# Patient Record
Sex: FEMALE | Race: OTHER | HISPANIC OR LATINO | URBAN - METROPOLITAN AREA
[De-identification: names, ages, dates, MRNs, and addresses within clinical notes are randomized per-mention and may not be internally consistent; named-entity substitution may affect disease eponyms.]

---

## 2024-11-11 ENCOUNTER — EMERGENCY (EMERGENCY)
Facility: HOSPITAL | Age: 29
LOS: 1 days | Discharge: HOME | End: 2024-11-11
Attending: EMERGENCY MEDICINE
Payer: COMMERCIAL

## 2024-11-11 VITALS
OXYGEN SATURATION: 100 % | RESPIRATION RATE: 16 BRPM | TEMPERATURE: 98 F | DIASTOLIC BLOOD PRESSURE: 65 MMHG | SYSTOLIC BLOOD PRESSURE: 101 MMHG | HEART RATE: 70 BPM

## 2024-11-11 VITALS
SYSTOLIC BLOOD PRESSURE: 113 MMHG | DIASTOLIC BLOOD PRESSURE: 71 MMHG | WEIGHT: 130.07 LBS | RESPIRATION RATE: 17 BRPM | HEART RATE: 74 BPM | TEMPERATURE: 99 F | HEIGHT: 65 IN | OXYGEN SATURATION: 100 %

## 2024-11-11 LAB
ALBUMIN SERPL ELPH-MCNC: 3.9 G/DL — SIGNIFICANT CHANGE UP (ref 3.5–5)
ALP SERPL-CCNC: 41 U/L — SIGNIFICANT CHANGE UP (ref 40–120)
ALT FLD-CCNC: 33 U/L DA — SIGNIFICANT CHANGE UP (ref 10–60)
ANION GAP SERPL CALC-SCNC: 4 MMOL/L — LOW (ref 5–17)
APPEARANCE UR: CLEAR — SIGNIFICANT CHANGE UP
AST SERPL-CCNC: 23 U/L — SIGNIFICANT CHANGE UP (ref 10–40)
BASOPHILS # BLD AUTO: 0.04 K/UL — SIGNIFICANT CHANGE UP (ref 0–0.2)
BASOPHILS NFR BLD AUTO: 0.3 % — SIGNIFICANT CHANGE UP (ref 0–2)
BILIRUB SERPL-MCNC: 0.6 MG/DL — SIGNIFICANT CHANGE UP (ref 0.2–1.2)
BILIRUB UR-MCNC: NEGATIVE — SIGNIFICANT CHANGE UP
BUN SERPL-MCNC: 13 MG/DL — SIGNIFICANT CHANGE UP (ref 7–18)
CALCIUM SERPL-MCNC: 8.9 MG/DL — SIGNIFICANT CHANGE UP (ref 8.4–10.5)
CHLORIDE SERPL-SCNC: 108 MMOL/L — SIGNIFICANT CHANGE UP (ref 96–108)
CO2 SERPL-SCNC: 24 MMOL/L — SIGNIFICANT CHANGE UP (ref 22–31)
COLOR SPEC: YELLOW — SIGNIFICANT CHANGE UP
CREAT SERPL-MCNC: 0.72 MG/DL — SIGNIFICANT CHANGE UP (ref 0.5–1.3)
DIFF PNL FLD: NEGATIVE — SIGNIFICANT CHANGE UP
EGFR: 117 ML/MIN/1.73M2 — SIGNIFICANT CHANGE UP
EOSINOPHIL # BLD AUTO: 0.06 K/UL — SIGNIFICANT CHANGE UP (ref 0–0.5)
EOSINOPHIL NFR BLD AUTO: 0.5 % — SIGNIFICANT CHANGE UP (ref 0–6)
GLUCOSE SERPL-MCNC: 92 MG/DL — SIGNIFICANT CHANGE UP (ref 70–99)
GLUCOSE UR QL: NEGATIVE MG/DL — SIGNIFICANT CHANGE UP
HCG UR QL: NEGATIVE — SIGNIFICANT CHANGE UP
HCT VFR BLD CALC: 34.7 % — SIGNIFICANT CHANGE UP (ref 34.5–45)
HGB BLD-MCNC: 11.9 G/DL — SIGNIFICANT CHANGE UP (ref 11.5–15.5)
IMM GRANULOCYTES NFR BLD AUTO: 0.3 % — SIGNIFICANT CHANGE UP (ref 0–0.9)
KETONES UR-MCNC: NEGATIVE MG/DL — SIGNIFICANT CHANGE UP
LACTATE SERPL-SCNC: 0.9 MMOL/L — SIGNIFICANT CHANGE UP (ref 0.7–2)
LEUKOCYTE ESTERASE UR-ACNC: NEGATIVE — SIGNIFICANT CHANGE UP
LIDOCAIN IGE QN: 35 U/L — SIGNIFICANT CHANGE UP (ref 13–75)
LYMPHOCYTES # BLD AUTO: 1.18 K/UL — SIGNIFICANT CHANGE UP (ref 1–3.3)
LYMPHOCYTES # BLD AUTO: 9.1 % — LOW (ref 13–44)
MCHC RBC-ENTMCNC: 30.1 PG — SIGNIFICANT CHANGE UP (ref 27–34)
MCHC RBC-ENTMCNC: 34.3 G/DL — SIGNIFICANT CHANGE UP (ref 32–36)
MCV RBC AUTO: 87.6 FL — SIGNIFICANT CHANGE UP (ref 80–100)
MONOCYTES # BLD AUTO: 0.41 K/UL — SIGNIFICANT CHANGE UP (ref 0–0.9)
MONOCYTES NFR BLD AUTO: 3.2 % — SIGNIFICANT CHANGE UP (ref 2–14)
NEUTROPHILS # BLD AUTO: 11.17 K/UL — HIGH (ref 1.8–7.4)
NEUTROPHILS NFR BLD AUTO: 86.6 % — HIGH (ref 43–77)
NITRITE UR-MCNC: NEGATIVE — SIGNIFICANT CHANGE UP
NRBC # BLD: 0 /100 WBCS — SIGNIFICANT CHANGE UP (ref 0–0)
PH UR: 6.5 — SIGNIFICANT CHANGE UP (ref 5–8)
PLATELET # BLD AUTO: 222 K/UL — SIGNIFICANT CHANGE UP (ref 150–400)
POTASSIUM SERPL-MCNC: 3.5 MMOL/L — SIGNIFICANT CHANGE UP (ref 3.5–5.3)
POTASSIUM SERPL-SCNC: 3.5 MMOL/L — SIGNIFICANT CHANGE UP (ref 3.5–5.3)
PROT SERPL-MCNC: 7.1 G/DL — SIGNIFICANT CHANGE UP (ref 6–8.3)
PROT UR-MCNC: NEGATIVE MG/DL — SIGNIFICANT CHANGE UP
RBC # BLD: 3.96 M/UL — SIGNIFICANT CHANGE UP (ref 3.8–5.2)
RBC # FLD: 11.6 % — SIGNIFICANT CHANGE UP (ref 10.3–14.5)
SODIUM SERPL-SCNC: 136 MMOL/L — SIGNIFICANT CHANGE UP (ref 135–145)
SP GR SPEC: 1.01 — SIGNIFICANT CHANGE UP (ref 1–1.03)
UROBILINOGEN FLD QL: 0.2 MG/DL — SIGNIFICANT CHANGE UP (ref 0.2–1)
WBC # BLD: 12.9 K/UL — HIGH (ref 3.8–10.5)
WBC # FLD AUTO: 12.9 K/UL — HIGH (ref 3.8–10.5)

## 2024-11-11 PROCEDURE — 93975 VASCULAR STUDY: CPT

## 2024-11-11 PROCEDURE — 76856 US EXAM PELVIC COMPLETE: CPT | Mod: 26,59

## 2024-11-11 PROCEDURE — 99285 EMERGENCY DEPT VISIT HI MDM: CPT

## 2024-11-11 PROCEDURE — 93975 VASCULAR STUDY: CPT | Mod: 26

## 2024-11-11 PROCEDURE — 99285 EMERGENCY DEPT VISIT HI MDM: CPT | Mod: 25

## 2024-11-11 PROCEDURE — 36415 COLL VENOUS BLD VENIPUNCTURE: CPT

## 2024-11-11 PROCEDURE — 76856 US EXAM PELVIC COMPLETE: CPT

## 2024-11-11 PROCEDURE — 74177 CT ABD & PELVIS W/CONTRAST: CPT | Mod: 26,MC

## 2024-11-11 PROCEDURE — 76830 TRANSVAGINAL US NON-OB: CPT | Mod: 26

## 2024-11-11 PROCEDURE — 85025 COMPLETE CBC W/AUTO DIFF WBC: CPT

## 2024-11-11 PROCEDURE — 83690 ASSAY OF LIPASE: CPT

## 2024-11-11 PROCEDURE — 83605 ASSAY OF LACTIC ACID: CPT

## 2024-11-11 PROCEDURE — 74177 CT ABD & PELVIS W/CONTRAST: CPT | Mod: MC

## 2024-11-11 PROCEDURE — 80053 COMPREHEN METABOLIC PANEL: CPT

## 2024-11-11 PROCEDURE — 81003 URINALYSIS AUTO W/O SCOPE: CPT

## 2024-11-11 PROCEDURE — 81025 URINE PREGNANCY TEST: CPT

## 2024-11-11 PROCEDURE — 76830 TRANSVAGINAL US NON-OB: CPT

## 2024-11-11 RX ORDER — SODIUM CHLORIDE 9 MG/ML
1000 INJECTION, SOLUTION INTRAMUSCULAR; INTRAVENOUS; SUBCUTANEOUS ONCE
Refills: 0 | Status: COMPLETED | OUTPATIENT
Start: 2024-11-11 | End: 2024-11-11

## 2024-11-11 RX ADMIN — SODIUM CHLORIDE 1000 MILLILITER(S): 9 INJECTION, SOLUTION INTRAMUSCULAR; INTRAVENOUS; SUBCUTANEOUS at 17:21

## 2024-11-11 NOTE — ED ADULT NURSE NOTE - OBJECTIVE STATEMENT
Patient presented to the ED complaining of lower abdominal pain since today with nausea, vomiting, and loose stool.

## 2024-11-11 NOTE — ED PROVIDER NOTE - PHYSICAL EXAMINATION
Gen: NAD, AOx3, able to make needs known, non-toxic  Head: NCAT  HEENT: EOMI, oral mucosa moist, normal conjunctiva  Lung: CTAB, no respiratory distress, no wheezes/rhonchi/rales B/L, speaking in full sentences  CV: RRR, no murmurs  Abd: +mild rlq ttp and suprapubic ttp,  non distended, soft, no guarding, no CVA tenderness  MSK: no visible deformities  Neuro: Appears non focal  Skin: Warm, well perfused, no rash  Psych: normal affect

## 2024-11-11 NOTE — ED PROVIDER NOTE - PROGRESS NOTE DETAILS
Labs nonactionable, ultrasound and CTAP without acute abnormality.  Patient states improvement in pain.  Discussed through  [place], [place] [id number].  Red flag signs and symptoms discussed as well as strict return precautions given patient verbalized understanding.

## 2024-11-11 NOTE — ED PROVIDER NOTE - NSTIMEPROVIDERCAREINITIATE_GEN_ER
Pt returned call to office.  Pt reports bleeding not as heavy as it was, but still moderate.   Pt saw MyChart message from [facility] last evening.   She knows to take iron bid for anemia.   She will plan to take 3 ocps today, 2 ocps tomorrow, then 1 daily.     Pt to let us know if gets  nauseated.   Office can/will send in anti-nausea med per [facility].   11-Nov-2024 14:24

## 2024-11-11 NOTE — ED PROVIDER NOTE - CLINICAL SUMMARY MEDICAL DECISION MAKING FREE TEXT BOX
29-year-old female no pertinent past medical history presenting to emergency with lower abdominal pain x 1 day.  1 episode of vomiting, labs little loose stool.  No travel or antibiotics.  PE as above differential diagnosis including but not limited to appendicitis, ovarian cyst, ovarian torsion, pregnancy, UTI will obtain labs, CTAP, ultrasound, reassess, dispo accordingly.

## 2024-11-11 NOTE — ED PROVIDER NOTE - NSFOLLOWUPINSTRUCTIONS_ED_ALL_ED_FT
1) Follow up with your doctor   2) Return to the ED immediately for new or worsening symptoms   3) Please continue to take any home medications as prescribed  4) Your test results from your ED visit were discussed with you prior to discharge  5) You were provided with a copy of your test results  6) You may take 600mg Motrin and or 975mg Tylenol every 8 hours as needed for pain       1) Tonya un seguimiento con mendez médico   2) Regrese al servicio de urgencias inmediatamente si los síntomas aparecen o empeoran.   3) Continúe tomando los medicamentos caseros según lo recetado.  4) Los resultados de las pruebas de mendez visita al servicio de urgencias se analizaron con usted antes del alexander.  5) Se le proporcionó harry copia de los resultados de mendez prueba.  6) Puede anni 600 mg de Motrin o 975 mg de Tylenol cada 8 horas según sea necesario para el dolor.    Dolor abdominal en los adultos  Abdominal Pain, Adult  A health care provider talking to a person during a medical exam.  El dolor en el abdomen (dolor abdominal) puede tener muchas causas. En la mayoría de los casos, mejora sin tratamiento o al recibir tratamiento en el hogar. Ami en algunos casos, puede ser grave.    El médico le hará preguntas sobre shey antecedentes médicos y le hará un examen físico para tratar de comprender la causa del dolor.    Siga estas instrucciones en mendez casa:  Medicamentos    [place] los medicamentos de venta sindy y los recetados solamente kiko se lo haya indicado el médico.  No tome medicamentos que lo ayuden a defecar (laxantes), chloe que el médico se lo indique.  Instrucciones generales    Controle mendez afección para detectar cualquier cambio.  Beber suficiente líquido para mantener el pis (orina) de color amarillo pálido.  Comuníquese con un médico si:  El dolor cambia, empeora o dura más de lo previsto.  Tiene cólicos o distensión abdominal intensos, o vomita.  El dolor empeora con las comidas, después de comer o con determinados alimentos.  Está estreñido o tiene diarrea anastasiya más de 2 o 3 días.  No tiene apetito o baja de peso sin proponérselo.  Tiene signos de deshidratación. Pueden incluir:  Orina oscura, muy escasa o falta de orina.  Labios agrietados o boca seca.  Somnolencia o debilidad.  Tiene dolor al hacer pis (orinar) o al defecar.  El dolor abdominal lo despierta de noche.  Observa shawn en la orina.  Tiene fiebre.  Solicite ayuda de inmediato si:  No puede dejar de vomitar.  El dolor solo se localiza en harry parte del abdomen. Si se localiza en la gricelda derecha, posiblemente podría tratarse de apendicitis.  Produce materia fecal (heces) con shawn, de color aldair, o con aspecto alquitranado.  Tiene dificultad para respirar.  Tiene dolor en el pecho.  Estos síntomas pueden indicar harry emergencia. Solicite ayuda de inmediato. Llame al 911.  No espere a chloe si los síntomas desaparecen.  No conduzca por shey propios medios hasta el hospital.  Esta información no tiene kiko fin reemplazar el consejo del médico. Asegúrese de hacerle al médico cualquier pregunta que tenga.  1) Tonya un seguimiento con mendez médico   2) Regrese al servicio de urgencias inmediatamente si los síntomas aparecen o empeoran.   3) Continúe tomando los medicamentos caseros según lo recetado.  4) Los resultados de las pruebas de mendez visita al servicio de urgencias se analizaron con usted antes del alexander.  5) Se le proporcionó harry copia de los resultados de mendez prueba.  6) Puede anni 600 mg de Motrin o 975 mg de Tylenol cada 8 horas según sea necesario para el dolor.

## 2024-11-11 NOTE — ED PROVIDER NOTE - OBJECTIVE STATEMENT
29-year-old female no pertinent past medical history, [place]-speaking   [id number]  used.  Presenting with sudden onset of lower abdominal pain this morning, states pain was so severe she felt like she was going to pass out.  Stayed localized in the lower abdomen.  Endorsing nausea and 1 episode of NBNB vomiting and looser than normal stool.  No recent travel or ABX use.  Started menses today but states pain is not consistent with menstrual pain.  However did take 1 Midol prior to arrival.  Denies chest pain, shortness of breath, diarrhea, fever, chills, urinary symptoms, vaginal discharge, headache, other complaint.

## 2024-11-11 NOTE — ED PROVIDER NOTE - NSFOLLOWUPCLINICS_GEN_ALL_ED_FT
[facility]  Gastroenterology  [address] [place] [address]  Phone: [phone]  Fax: [phone]

## 2024-11-11 NOTE — ED PROVIDER NOTE - PATIENT PORTAL LINK FT
You can access the FollowMyHealth Patient Portal offered by [facility] by registering at the following website: [email or web address]. By joining [facility]’s FollowMyHealth portal, you will also be able to view your health information using other applications (apps) compatible with our system.